# Patient Record
Sex: FEMALE
[De-identification: names, ages, dates, MRNs, and addresses within clinical notes are randomized per-mention and may not be internally consistent; named-entity substitution may affect disease eponyms.]

---

## 2017-08-01 ENCOUNTER — RX ONLY (RX ONLY)
Age: 77
End: 2017-08-01

## 2017-08-24 ENCOUNTER — RX ONLY (RX ONLY)
Age: 77
End: 2017-08-24

## 2022-02-08 ENCOUNTER — RX ONLY (RX ONLY)
Age: 82
End: 2022-02-08

## 2023-11-21 ENCOUNTER — APPOINTMENT (OUTPATIENT)
Dept: URBAN - METROPOLITAN AREA CLINIC 184 | Age: 83
Setting detail: DERMATOLOGY
End: 2023-11-21

## 2023-11-21 DIAGNOSIS — L82.1 OTHER SEBORRHEIC KERATOSIS: ICD-10-CM

## 2023-11-21 DIAGNOSIS — L65.9 NONSCARRING HAIR LOSS, UNSPECIFIED: ICD-10-CM

## 2023-11-21 DIAGNOSIS — L71.8 OTHER ROSACEA: ICD-10-CM

## 2023-11-21 PROCEDURE — OTHER COUNSELING: OTHER

## 2023-11-21 PROCEDURE — OTHER MIPS QUALITY: OTHER

## 2023-11-21 PROCEDURE — 99213 OFFICE O/P EST LOW 20 MIN: CPT

## 2023-11-21 ASSESSMENT — LOCATION DETAILED DESCRIPTION DERM
LOCATION DETAILED: LEFT MEDIAL MALAR CHEEK
LOCATION DETAILED: RIGHT MEDIAL MALAR CHEEK
LOCATION DETAILED: POSTERIOR MID-PARIETAL SCALP
LOCATION DETAILED: LEFT PROXIMAL CALF

## 2023-11-21 ASSESSMENT — LOCATION ZONE DERM
LOCATION ZONE: FACE
LOCATION ZONE: SCALP
LOCATION ZONE: LEG

## 2023-11-21 ASSESSMENT — LOCATION SIMPLE DESCRIPTION DERM
LOCATION SIMPLE: RIGHT CHEEK
LOCATION SIMPLE: LEFT CALF
LOCATION SIMPLE: LEFT CHEEK
LOCATION SIMPLE: POSTERIOR SCALP

## 2023-11-21 NOTE — PROCEDURE: COUNSELING
Detail Level: Detailed
Patient Specific Counseling (Will Not Stick From Patient To Patient): Can use OTC nutraful, biotin. Pt states her shedding is much better, but wanted to keep appt.\\nContinue using minoxidil and ketoconazole

## 2023-11-21 NOTE — HPI: HAIR LOSS
How Did The Hair Loss Occur?: sudden in onset
How Severe Is Your Hair Loss?: mild
What Hair Products Do You Use?: No products
Additional History: Patient states her hair has been getting better but would still like to talk about it

## 2025-05-28 ENCOUNTER — APPOINTMENT (OUTPATIENT)
Dept: URBAN - METROPOLITAN AREA CLINIC 184 | Age: 85
Setting detail: DERMATOLOGY
End: 2025-05-28

## 2025-05-28 DIAGNOSIS — L21.8 OTHER SEBORRHEIC DERMATITIS: ICD-10-CM

## 2025-05-28 PROCEDURE — OTHER PRESCRIPTION: OTHER

## 2025-05-28 PROCEDURE — OTHER COUNSELING: OTHER

## 2025-05-28 PROCEDURE — OTHER MIPS QUALITY: OTHER

## 2025-05-28 PROCEDURE — 99213 OFFICE O/P EST LOW 20 MIN: CPT

## 2025-05-28 RX ORDER — FLUOCINOLONE ACETONIDE 0.01 MG/100ML
OIL TOPICAL
Qty: 118.28 | Refills: 3 | Status: ERX | COMMUNITY
Start: 2025-05-28

## 2025-05-28 ASSESSMENT — LOCATION DETAILED DESCRIPTION DERM
LOCATION DETAILED: LEFT MID PREAURICULAR CHEEK
LOCATION DETAILED: RIGHT CRUS OF HELIX

## 2025-05-28 ASSESSMENT — LOCATION SIMPLE DESCRIPTION DERM
LOCATION SIMPLE: LEFT CHEEK
LOCATION SIMPLE: RIGHT EAR

## 2025-05-28 ASSESSMENT — LOCATION ZONE DERM
LOCATION ZONE: EAR
LOCATION ZONE: FACE